# Patient Record
Sex: FEMALE | Race: WHITE | NOT HISPANIC OR LATINO | URBAN - METROPOLITAN AREA
[De-identification: names, ages, dates, MRNs, and addresses within clinical notes are randomized per-mention and may not be internally consistent; named-entity substitution may affect disease eponyms.]

---

## 2017-04-19 ENCOUNTER — OFFICE VISIT (OUTPATIENT)
Dept: FAMILY MEDICINE | Facility: CLINIC | Age: 27
End: 2017-04-19
Payer: COMMERCIAL

## 2017-04-19 VITALS
HEART RATE: 92 BPM | WEIGHT: 145.3 LBS | SYSTOLIC BLOOD PRESSURE: 118 MMHG | HEIGHT: 66 IN | BODY MASS INDEX: 23.35 KG/M2 | TEMPERATURE: 97.8 F | DIASTOLIC BLOOD PRESSURE: 86 MMHG

## 2017-04-19 DIAGNOSIS — M65.4 DE QUERVAIN'S TENOSYNOVITIS, LEFT: Primary | ICD-10-CM

## 2017-04-19 PROCEDURE — 99213 OFFICE O/P EST LOW 20 MIN: CPT | Performed by: FAMILY MEDICINE

## 2017-04-19 RX ORDER — NORETHINDRONE 0.35 MG/1
TABLET ORAL
Refills: 4 | COMMUNITY
Start: 2017-02-14 | End: 2018-01-25

## 2017-04-19 NOTE — PROGRESS NOTES
SUBJECTIVE:                                                    Yeni Zelaya is a 26 year old female who presents to clinic today for the following health issues:      Musculoskeletal problem/pain      Duration: ongoing for a few months    Description  Location: L wrist    Intensity:  moderate    Accompanying signs and symptoms: none    History  Previous similar problem: no   Previous evaluation:  none    Precipitating or alleviating factors:  Trauma or overuse: YES- worse with any motion  Aggravating factors include: lifting child    Therapies tried and outcome: nothing     Left wrist pain -   Started shortly after birth of daughter 6 months ago -   Pain a little more   Waking at night with symptoms sometimes  Initially had some in the right wrist -     -------------------------------------    Problem list and histories reviewed & adjusted, as indicated.  Additional history: as documented    Patient Active Problem List   Diagnosis     iamLUMBAGO     CARDIOVASCULAR SCREENING; LDL GOAL LESS THAN 160     Health Care Home     Dysthymia     Past Surgical History:   Procedure Laterality Date     HC TOOTH EXTRACTION W/FORCEP  2007    wisdom teeth extraction     PE TUBES  1992    b/l       Social History   Substance Use Topics     Smoking status: Former Smoker     Smokeless tobacco: Never Used      Comment: used to smoke ~ once a month     Alcohol use No      Comment: occ     Family History   Problem Relation Age of Onset     Asthma Brother      Breast Cancer Maternal Grandmother      Hypertension Maternal Grandmother      Alcohol/Drug Maternal Grandmother      Arthritis Maternal Grandmother      Depression Mother      Neurologic Disorder Mother      Thyroid Disease Father      Allergies       self     Allergies Mother      Allergies Brother      Depression       self     Depression Father      Depression Brother      GASTROINTESTINAL DISEASE Father      Asthma       self           Reviewed and updated as needed this  "visit by clinical staff  Tobacco  Allergies  Meds  Problems  Med Hx  Surg Hx  Fam Hx  Soc Hx        Reviewed and updated as needed this visit by Provider  Allergies  Meds  Problems         ROS:  Constitutional, HEENT, cardiovascular, pulmonary, gi and gu systems are negative, except as otherwise noted.    OBJECTIVE:                                                    /86  Pulse 92  Temp 97.8  F (36.6  C) (Oral)  Ht 5' 6\" (1.676 m)  Wt 145 lb 4.8 oz (65.9 kg)  LMP 04/17/2017  BMI 23.45 kg/m2  Body mass index is 23.45 kg/(m^2).  GENERAL: healthy, alert and no distress  MS: LUE exam shows normal strength and muscle mass, no deformities, no erythema, induration, or nodules, no evidence of joint effusion, ROM of all joints is normal, no evidence of joint instability and tenderness over the thumb extensors  SKIN: no suspicious lesions or rashes    Diagnostic Test Results:  none      ASSESSMENT/PLAN:                                                      1. De Quervain's tenosynovitis, left  Suspect de quervains that is exacerbated by holding her 6 month old baby.   Reviewed antiinflammatory - ibuprofen 200mg - 2 tablets q8 hours and ice.  Thumb spica splint give at todays visit   If not improving discussed injection   Pt will call or RTC if symptoms worsen or do not improve.         Zakia Murphy, DO  [facility]    "

## 2017-04-19 NOTE — MR AVS SNAPSHOT
"              After Visit Summary   4/19/2017    Yeni Zelaya    MRN: [id number]           Patient Information     Date Of Birth          1990        Visit Information        Provider Department      4/19/2017 11:00 AM Zakia Murphy, DO [facility]        Today's Diagnoses     De Quervain's tenosynovitis, left    -  1       Follow-ups after your visit        Who to contact     If you have questions or need follow up information about today's clinic visit or your schedule please contact [facility] directly at [phone].  Normal or non-critical lab and imaging results will be communicated to you by Aquaspyhart, letter or phone within 4 business days after the clinic has received the results. If you do not hear from us within 7 days, please contact the clinic through Aquaspyhart or phone. If you have a critical or abnormal lab result, we will notify you by phone as soon as possible.  Submit refill requests through [employer] or call your pharmacy and they will forward the refill request to us. Please allow 3 business days for your refill to be completed.          Additional Information About Your Visit        MyChart Information     [employer] gives you secure access to your electronic health record. If you see a primary care provider, you can also send messages to your care team and make appointments. If you have questions, please call your primary care clinic.  If you do not have a primary care provider, please call [phone] and they will assist you.        Care EveryWhere ID     This is your Care EveryWhere ID. This could be used by other organizations to access your [place] medical records  [id number]        Your Vitals Were     Pulse Temperature Height Last Period BMI (Body Mass Index)       92 97.8  F (36.6  C) (Oral) 5' 6\" (1.676 m) 04/17/2017 23.45 kg/m2        Blood Pressure from Last 3 Encounters:   04/19/17 118/86   08/02/16 140/72   05/18/16 138/90    Weight from Last 3 " Encounters:   04/19/17 145 lb 4.8 oz (65.9 kg)   08/02/16 159 lb 9.6 oz (72.4 kg)   05/18/16 144 lb 8 oz (65.5 kg)              Today, you had the following     No orders found for display       Primary Care Provider Office Phone # Fax #    Zakia Murphy [phone]       [facility] [address] [facility] [address]        Thank you!     Thank you for choosing [facility]  for your care. Our goal is always to provide you with excellent care. Hearing back from our patients is one way we can continue to improve our services. Please take a few minutes to complete the written survey that you may receive in the mail after your visit with us. Thank you!             Your Updated Medication List - Protect others around you: Learn how to safely use, store and throw away your medicines at www.disposemymeds.org.          This list is accurate as of: 4/19/17  5:00 PM.  Always use your most recent med list.                   Brand Name Dispense Instructions for use    YRIS 0.35 MG per tablet   Generic drug:  norethindrone          prenatal multivitamin  plus iron 27-0.8 MG Tabs per tablet     100 tablet    Take 1 tablet by mouth daily       VITAMIN D PO      Take 3,000 Units by mouth daily

## 2017-04-19 NOTE — NURSING NOTE
"Chief Complaint   Patient presents with     Musculoskeletal Problem     L wrist pain, ongoing for a few months, no specific injury     /86  Pulse 92  Temp 97.8  F (36.6  C) (Oral)  Ht 5' 6\" (1.676 m)  Wt 145 lb 4.8 oz (65.9 kg)  LMP 04/17/2017  BMI 23.45 kg/m2 Estimated body mass index is 23.45 kg/(m^2) as calculated from the following:    Height as of this encounter: 5' 6\" (1.676 m).    Weight as of this encounter: 145 lb 4.8 oz (65.9 kg).  BP completed using cuff size: regular       Health Maintenance due pending provider review:  NONE    n/a    Bia Mccormack CMA  "

## 2017-04-20 ASSESSMENT — PATIENT HEALTH QUESTIONNAIRE - PHQ9: SUM OF ALL RESPONSES TO PHQ QUESTIONS 1-9: 0

## 2017-05-01 DIAGNOSIS — Z78.9 BREASTFEEDING (INFANT): Primary | ICD-10-CM

## 2017-05-01 NOTE — TELEPHONE ENCOUNTER
Reason for Call: Request for an order or referral:    Order or referral being requested: Requesting Breast pump order to be sent to [facility]  At [phone]    Date needed: at your convenience    Has the patient been seen by the PCP for this problem? YES    Additional comments: pt would like a call as soon as RX is faxed over    Phone number Patient can be reached at:  Home number on file [phone] (home)    Best Time:  anytime    Can we leave a detailed message on this number?  YES    Call taken on 5/1/2017 at 10:59 AM by Indiana Ramirez

## 2017-05-04 NOTE — TELEPHONE ENCOUNTER
Per patient they never received the Fax for the Breast pump and she asked   That it be refaxed ASAP    Fax # [phone]    Please call patient once this has been refaxed  Thank You

## 2018-01-20 ENCOUNTER — HEALTH MAINTENANCE LETTER (OUTPATIENT)
Age: 28
End: 2018-01-20

## 2018-01-25 DIAGNOSIS — Z30.41 ENCOUNTER FOR SURVEILLANCE OF CONTRACEPTIVE PILLS: Primary | ICD-10-CM

## 2018-01-25 NOTE — TELEPHONE ENCOUNTER
[employer] message sent to patient  Due for physical  Med is patient reported/historical  Payton BURGOS RN

## 2018-01-29 NOTE — TELEPHONE ENCOUNTER
PN,  See below messages    Routing refill request to provider for review/approval because:  Medication is reported/historical  Payton BURGOS RN    Next 5 appointments (look out 90 days)     Feb 07, 2018 11:15 AM GIOVANI Cartagena Physical Adult with Zakia Murphy DO   [facility] ([facility])    [address]  [facility] [id number] [phone]

## 2018-01-30 RX ORDER — NORETHINDRONE 0.35 MG/1
1 TABLET ORAL DAILY
Qty: 28 TABLET | Refills: 0 | Status: SHIPPED | OUTPATIENT
Start: 2018-01-30 | End: 2018-02-07

## 2018-02-07 ENCOUNTER — OFFICE VISIT (OUTPATIENT)
Dept: FAMILY MEDICINE | Facility: CLINIC | Age: 28
End: 2018-02-07
Payer: COMMERCIAL

## 2018-02-07 VITALS
HEIGHT: 66 IN | DIASTOLIC BLOOD PRESSURE: 84 MMHG | HEART RATE: 97 BPM | BODY MASS INDEX: 21.86 KG/M2 | WEIGHT: 136 LBS | TEMPERATURE: 98.9 F | RESPIRATION RATE: 14 BRPM | SYSTOLIC BLOOD PRESSURE: 138 MMHG | OXYGEN SATURATION: 96 %

## 2018-02-07 DIAGNOSIS — R03.0 ELEVATED BLOOD PRESSURE READING WITHOUT DIAGNOSIS OF HYPERTENSION: ICD-10-CM

## 2018-02-07 DIAGNOSIS — Z00.00 ENCOUNTER FOR ROUTINE ADULT HEALTH EXAMINATION WITHOUT ABNORMAL FINDINGS: Primary | ICD-10-CM

## 2018-02-07 DIAGNOSIS — Z30.41 ENCOUNTER FOR SURVEILLANCE OF CONTRACEPTIVE PILLS: ICD-10-CM

## 2018-02-07 PROCEDURE — 99395 PREV VISIT EST AGE 18-39: CPT | Performed by: FAMILY MEDICINE

## 2018-02-07 PROCEDURE — 36415 COLL VENOUS BLD VENIPUNCTURE: CPT | Performed by: FAMILY MEDICINE

## 2018-02-07 PROCEDURE — 84443 ASSAY THYROID STIM HORMONE: CPT | Performed by: FAMILY MEDICINE

## 2018-02-07 PROCEDURE — 80048 BASIC METABOLIC PNL TOTAL CA: CPT | Performed by: FAMILY MEDICINE

## 2018-02-07 PROCEDURE — G0145 SCR C/V CYTO,THINLAYER,RESCR: HCPCS | Performed by: FAMILY MEDICINE

## 2018-02-07 PROCEDURE — 80061 LIPID PANEL: CPT | Performed by: FAMILY MEDICINE

## 2018-02-07 RX ORDER — NORETHINDRONE 0.35 MG/1
1 TABLET ORAL DAILY
Qty: 84 TABLET | Refills: 4 | Status: SHIPPED | OUTPATIENT
Start: 2018-02-07 | End: 2019-06-18

## 2018-02-07 ASSESSMENT — PATIENT HEALTH QUESTIONNAIRE - PHQ9
SUM OF ALL RESPONSES TO PHQ QUESTIONS 1-9: 0
SUM OF ALL RESPONSES TO PHQ QUESTIONS 1-9: 0

## 2018-02-07 NOTE — MR AVS SNAPSHOT
After Visit Summary   2/7/2018    Yeni Zelaya    MRN: [id number]           Patient Information     Date Of Birth          1990        Visit Information        Provider Department      2/7/2018 11:15 AM Zakia Murphy DO [facility]        Today's Diagnoses     Encounter for routine adult health examination without abnormal findings    -  1    Encounter for surveillance of contraceptive pills        Elevated blood pressure reading without diagnosis of hypertension          Care Instructions      Preventive Health Recommendations  Female Ages 26 - 39  Yearly exam:   See your health care provider every year in order to    Review health changes.     Discuss preventive care.      Review your medicines if you your doctor has prescribed any.    Until age 30: Get a Pap test every three years (more often if you have had an abnormal result).    After age 30: Talk to your doctor about whether you should have a Pap test every 3 years or have a Pap test with HPV screening every 5 years.   You do not need a Pap test if your uterus was removed (hysterectomy) and you have not had cancer.  You should be tested each year for STDs (sexually transmitted diseases), if you're at risk.   Talk to your provider about how often to have your cholesterol checked.  If you are at risk for diabetes, you should have a diabetes test (fasting glucose).  Shots: Get a flu shot each year. Get a tetanus shot every 10 years.   Nutrition:     Eat at least 5 servings of fruits and vegetables each day.    Eat whole-grain bread, whole-wheat pasta and brown rice instead of white grains and rice.    Talk to your provider about Calcium and Vitamin D.     Lifestyle    Exercise at least 150 minutes a week (30 minutes a day, 5 days of the week). This will help you control your weight and prevent disease.    Limit alcohol to one drink per day.    No smoking.     Wear sunscreen to prevent skin cancer.    See your dentist every six  "months for an exam and cleaning.            Follow-ups after your visit        Who to contact     If you have questions or need follow up information about today's clinic visit or your schedule please contact [facility] directly at [phone].  Normal or non-critical lab and imaging results will be communicated to you by [employer], letter or phone within 4 business days after the clinic has received the results. If you do not hear from us within 7 days, please contact the clinic through [employer] or phone. If you have a critical or abnormal lab result, we will notify you by phone as soon as possible.  Submit refill requests through [employer] or call your pharmacy and they will forward the refill request to us. Please allow 3 business days for your refill to be completed.          Additional Information About Your Visit        [employer] Information     [employer] gives you secure access to your electronic health record. If you see a primary care provider, you can also send messages to your care team and make appointments. If you have questions, please call your primary care clinic.  If you do not have a primary care provider, please call [phone] and they will assist you.        Care EveryWhere ID     This is your Care EveryWhere ID. This could be used by other organizations to access your [place] medical records  [id number]        Your Vitals Were     Pulse Temperature Respirations Height Pulse Oximetry Breastfeeding?    97 98.9  F (37.2  C) (Tympanic) 14 5' 6\" (1.676 m) 96% No    BMI (Body Mass Index)                   21.95 kg/m2            Blood Pressure from Last 3 Encounters:   02/07/18 138/84   04/19/17 118/86   08/02/16 140/72    Weight from Last 3 Encounters:   02/07/18 136 lb (61.7 kg)   04/19/17 145 lb 4.8 oz (65.9 kg)   08/02/16 159 lb 9.6 oz (72.4 kg)              We Performed the Following     Basic metabolic panel  (Ca, Cl, CO2, Creat, Gluc, K, Na, BUN)     Lipid panel reflex to direct LDL " Non-fasting     Pap imaged thin layer screen reflex to HPV if ASCUS - recommend age 25 - 29     TSH with free T4 reflex          Where to get your medicines      These medications were sent to [id number] - [place], [place] - [address]  [address] [address]     Phone:  [phone]     YRIS 0.35 MG per tablet          Primary Care Provider Office Phone # Fax #    Zakia Murphy,  [phone] [address] [address]        Equal Access to Services     [facility] SATURNINO : Hadii aad ku hadasho Soomaali, waaxda luqadaha, qaybta kaalmada adeegyada, kirit storey . So [facility] [phone].    ATENCIÓN: Si habla español, tiene a tamez disposición servicios gratuitos de asistencia lingüística. Llame al [phone].    We comply with applicable federal civil rights laws and [place] laws. We do not discriminate on the basis of race, color, national origin, age, disability, sex, sexual orientation, or gender identity.            Thank you!     Thank you for choosing [facility]  for your care. Our goal is always to provide you with excellent care. Hearing back from our patients is one way we can continue to improve our services. Please take a few minutes to complete the written survey that you may receive in the mail after your visit with us. Thank you!             Your Updated Medication List - Protect others around you: Learn how to safely use, store and throw away your medicines at www.disposemymeds.org.          This list is accurate as of 2/7/18  2:07 PM.  Always use your most recent med list.                   Brand Name Dispense Instructions for use Diagnosis    YRIS 0.35 MG per tablet   Generic drug:  norethindrone     84 tablet    Take 1 tablet (0.35 mg) by mouth daily    Encounter for surveillance of contraceptive pills       order for DME     1 Device    Breast pump - double electric     Breastfeeding (infant)       prenatal multivitamin plus iron 27-0.8 MG Tabs per tablet     100 tablet    Take 1 tablet by mouth daily        UNABLE TO FIND      Take 1,200 mg by mouth daily MEDICATION NAME: sunflower- Lecithin 1200mg capsule- 1-3 per day for clogged milk ducts        VITAMIN D PO      Take 3,000 Units by mouth daily

## 2018-02-08 LAB
ANION GAP SERPL CALCULATED.3IONS-SCNC: 5 MMOL/L (ref 3–14)
BUN SERPL-MCNC: 14 MG/DL (ref 7–30)
CALCIUM SERPL-MCNC: 9.4 MG/DL (ref 8.5–10.1)
CHLORIDE SERPL-SCNC: 104 MMOL/L (ref 94–109)
CHOLEST SERPL-MCNC: 158 MG/DL
CO2 SERPL-SCNC: 31 MMOL/L (ref 20–32)
CREAT SERPL-MCNC: 0.68 MG/DL (ref 0.52–1.04)
GFR SERPL CREATININE-BSD FRML MDRD: >90 ML/MIN/1.7M2
GLUCOSE SERPL-MCNC: 78 MG/DL (ref 70–99)
HDLC SERPL-MCNC: 77 MG/DL
LDLC SERPL CALC-MCNC: 72 MG/DL
NONHDLC SERPL-MCNC: 81 MG/DL
POTASSIUM SERPL-SCNC: 4.3 MMOL/L (ref 3.4–5.3)
SODIUM SERPL-SCNC: 140 MMOL/L (ref 133–144)
TRIGL SERPL-MCNC: 44 MG/DL
TSH SERPL DL<=0.005 MIU/L-ACNC: 1.55 MU/L (ref 0.4–4)

## 2018-02-08 ASSESSMENT — PATIENT HEALTH QUESTIONNAIRE - PHQ9: SUM OF ALL RESPONSES TO PHQ QUESTIONS 1-9: 0

## 2018-02-08 NOTE — PROGRESS NOTES
Dear Yeni,   Your test results are all back -   -All of your labs are normal.  Let us know if you have any questions.  -Zakia Murphy, DO

## 2018-02-09 LAB
COPATH REPORT: NORMAL
PAP: NORMAL

## 2018-07-20 ENCOUNTER — OFFICE VISIT (OUTPATIENT)
Dept: FAMILY MEDICINE | Facility: CLINIC | Age: 28
End: 2018-07-20
Payer: COMMERCIAL

## 2018-07-20 VITALS
BODY MASS INDEX: 22.03 KG/M2 | DIASTOLIC BLOOD PRESSURE: 84 MMHG | SYSTOLIC BLOOD PRESSURE: 134 MMHG | OXYGEN SATURATION: 98 % | WEIGHT: 137.1 LBS | TEMPERATURE: 97.8 F | HEIGHT: 66 IN

## 2018-07-20 DIAGNOSIS — M79.2 NERVE PAIN: Primary | ICD-10-CM

## 2018-07-20 PROCEDURE — 99213 OFFICE O/P EST LOW 20 MIN: CPT | Performed by: FAMILY MEDICINE

## 2018-07-20 NOTE — MR AVS SNAPSHOT
"              After Visit Summary   7/20/2018    Yeni Zelaya    MRN: [id number]           Patient Information     Date Of Birth          1990        Visit Information        Provider Department      7/20/2018 11:15 AM Zakia Murpyh, DO [facility]        Today's Diagnoses     Nerve pain    -  1       Follow-ups after your visit        Who to contact     If you have questions or need follow up information about today's clinic visit or your schedule please contact [facility] directly at [phone].  Normal or non-critical lab and imaging results will be communicated to you by [employer], letter or phone within 4 business days after the clinic has received the results. If you do not hear from us within 7 days, please contact the clinic through [employer] or phone. If you have a critical or abnormal lab result, we will notify you by phone as soon as possible.  Submit refill requests through Viewbix or call your pharmacy and they will forward the refill request to us. Please allow 3 business days for your refill to be completed.          Additional Information About Your Visit        MyChart Information     [employer] gives you secure access to your electronic health record. If you see a primary care provider, you can also send messages to your care team and make appointments. If you have questions, please call your primary care clinic.  If you do not have a primary care provider, please call [phone] and they will assist you.        Care EveryWhere ID     This is your Care EveryWhere ID. This could be used by other organizations to access your [place] medical records  [id number]        Your Vitals Were     Temperature Height Pulse Oximetry Breastfeeding? BMI (Body Mass Index)       97.8  F (36.6  C) (Oral) 5' 6\" (1.676 m) 98% Yes 22.13 kg/m2        Blood Pressure from Last 3 Encounters:   07/20/18 134/84   02/07/18 138/84   04/19/17 118/86    Weight from Last 3 Encounters:   07/20/18 " 137 lb 1.6 oz (62.2 kg)   02/07/18 136 lb (61.7 kg)   04/19/17 145 lb 4.8 oz (65.9 kg)              Today, you had the following     No orders found for display       Primary Care Provider Office Phone # Fax #    Zakia Murphy [phone] [address] [address]        Equal Access to Services     RAZIA MATAMOROS : Hadii aad ku hadasho Soomaali, waaxda luqadaha, qaybta kaalmada adeegyada, waxay idiin hayaan adeeg kharash lanaldo . So [facility] [phone].    ATENCIÓN: Si habla español, tiene a tamez disposición servicios gratuitos de asistencia lingüística. Llame al [phone].    We comply with applicable federal civil rights laws and [place] laws. We do not discriminate on the basis of race, color, national origin, age, disability, sex, sexual orientation, or gender identity.            Thank you!     Thank you for choosing [facility]  for your care. Our goal is always to provide you with excellent care. Hearing back from our patients is one way we can continue to improve our services. Please take a few minutes to complete the written survey that you may receive in the mail after your visit with us. Thank you!             Your Updated Medication List - Protect others around you: Learn how to safely use, store and throw away your medicines at www.disposemymeds.org.          This list is accurate as of 7/20/18  4:52 PM.  Always use your most recent med list.                   Brand Name Dispense Instructions for use Diagnosis    YRIS 0.35 MG per tablet   Generic drug:  norethindrone     84 tablet    Take 1 tablet (0.35 mg) by mouth daily    Encounter for surveillance of contraceptive pills       order for DME     1 Device    Breast pump - double electric    Breastfeeding (infant)       prenatal multivitamin plus iron 27-0.8 MG Tabs per tablet     100 tablet    Take 1 tablet by mouth daily        UNABLE TO FIND      Take 1,200 mg by mouth daily MEDICATION NAME:  sunflower- Lecithin 1200mg capsule- 1-3 per day for clogged milk ducts        VITAMIN D PO      Take 3,000 Units by mouth daily

## 2018-07-20 NOTE — PROGRESS NOTES
SUBJECTIVE:   Yeni Zelaya is a 28 year old female who presents to clinic today for the following health issues:      Burning sensation in are on L thigh      Duration: 6-7 weeks    Description (location/character/radiation):  Burning sensation feeling on back of L thigh, happening intermittently, pt can't seem to find a pattern to when it happens. Lasting from 1-20 minutes approximately    Intensity:  moderate    Accompanying signs and symptoms: no recent travel, no new irritants introduced.  Pt states feels more skin like, not muscle    History (similar episodes/previous evaluation): None    Precipitating or alleviating factors:     Therapies tried and outcome: None     Left posterior thigh sensation   Feels like rug burn  At onset - just noticed - and no skin change  Intermittent   Multiple times a day  No trauma or injury  No skin lesions  No new back issues   -------------------------------------    Problem list and histories reviewed & adjusted, as indicated.  Additional history: as documented    Patient Active Problem List   Diagnosis     iamLUMBAGO     CARDIOVASCULAR SCREENING; LDL GOAL LESS THAN 160     Health Care Home     Past Surgical History:   Procedure Laterality Date     HC TOOTH EXTRACTION W/FORCEP  2007    wisdom teeth extraction     PE TUBES  1992    b/l       Social History   Substance Use Topics     Smoking status: Former Smoker     Smokeless tobacco: Never Used      Comment: used to smoke ~ once a month     Alcohol use No      Comment: occ     Family History   Problem Relation Age of Onset     Asthma Brother      Breast Cancer Maternal Grandmother      Hypertension Maternal Grandmother      Alcohol/Drug Maternal Grandmother      Arthritis Maternal Grandmother      Depression Mother      Neurologic Disorder Mother      Thyroid Disease Father      Allergies       self     Allergies Mother      Allergies Brother      Depression       self     Depression Father      Depression Brother       "GASTROINTESTINAL DISEASE Father      Asthma       self           Reviewed and updated as needed this visit by clinical staff       Reviewed and updated as needed this visit by Provider         ROS:  Constitutional, HEENT, cardiovascular, pulmonary, gi and gu systems are negative, except as otherwise noted.    OBJECTIVE:     /84  Temp 97.8  F (36.6  C) (Oral)  Ht 5' 6\" (1.676 m)  Wt 137 lb 1.6 oz (62.2 kg)  SpO2 98%  Breastfeeding? Yes  BMI 22.13 kg/m2  Body mass index is 22.13 kg/(m^2).  GENERAL: healthy, alert and no distress  MS: no gross musculoskeletal defects noted, no edema  SKIN: no suspicious lesions or rashes  NEURO: Normal strength and tone and sensory exam grossly normal    Diagnostic Test Results:  none     ASSESSMENT/PLAN:     1. Nerve pain  Left posterior cutaneous nerve impingement symptosm  Discussed this is typically superficial since in isolation  Advised warning signs or symptoms - worsens or develops any other symptoms  Consider topical lidocaine if painful  If persists would refer to neurology.      Pt will call or RTC if symptoms worsen or do not improve.      Zakia Murphy, DO  [facility]    "

## 2018-07-20 NOTE — NURSING NOTE
"Chief Complaint   Patient presents with     burning sensation     /84  Temp 97.8  F (36.6  C) (Oral)  Ht 5' 6\" (1.676 m)  Wt 137 lb 1.6 oz (62.2 kg)  SpO2 98%  Breastfeeding? Yes  BMI 22.13 kg/m2 Estimated body mass index is 22.13 kg/(m^2) as calculated from the following:    Height as of this encounter: 5' 6\" (1.676 m).    Weight as of this encounter: 137 lb 1.6 oz (62.2 kg).        Health Maintenance due pending provider review:  NONE    n/a    Bia Mccormack CMA  "

## 2019-03-28 ENCOUNTER — RECORDS - HEALTHEAST (OUTPATIENT)
Dept: ADMINISTRATIVE | Facility: OTHER | Age: 29
End: 2019-03-28

## 2019-04-02 ENCOUNTER — ANESTHESIA - HEALTHEAST (OUTPATIENT)
Dept: SURGERY | Facility: AMBULATORY SURGERY CENTER | Age: 29
End: 2019-04-02

## 2019-04-02 ASSESSMENT — MIFFLIN-ST. JEOR
SCORE: 1359.31
SCORE: 1359.31

## 2019-04-03 ENCOUNTER — SURGERY - HEALTHEAST (OUTPATIENT)
Dept: SURGERY | Facility: AMBULATORY SURGERY CENTER | Age: 29
End: 2019-04-03

## 2019-04-03 ENCOUNTER — HOSPITAL ENCOUNTER (OUTPATIENT)
Dept: SURGERY | Facility: AMBULATORY SURGERY CENTER | Age: 29
Discharge: HOME | End: 2019-04-03
Attending: OBSTETRICS & GYNECOLOGY | Admitting: OBSTETRICS & GYNECOLOGY
Payer: COMMERCIAL

## 2019-04-03 DIAGNOSIS — O02.1 MISSED ABORTION: ICD-10-CM

## 2019-04-03 ASSESSMENT — MIFFLIN-ST. JEOR
SCORE: 1358.18
SCORE: 1358.18

## 2019-06-18 ENCOUNTER — OFFICE VISIT (OUTPATIENT)
Dept: FAMILY MEDICINE | Facility: CLINIC | Age: 29
End: 2019-06-18
Payer: COMMERCIAL

## 2019-06-18 VITALS
DIASTOLIC BLOOD PRESSURE: 76 MMHG | TEMPERATURE: 98.5 F | SYSTOLIC BLOOD PRESSURE: 130 MMHG | HEIGHT: 66 IN | WEIGHT: 140.4 LBS | HEART RATE: 96 BPM | BODY MASS INDEX: 22.56 KG/M2

## 2019-06-18 DIAGNOSIS — N94.9 FEMALE GENITAL SYMPTOMS: ICD-10-CM

## 2019-06-18 DIAGNOSIS — Z00.00 ROUTINE GENERAL MEDICAL EXAMINATION AT A HEALTH CARE FACILITY: Primary | ICD-10-CM

## 2019-06-18 LAB
ALBUMIN UR-MCNC: NEGATIVE MG/DL
APPEARANCE UR: CLEAR
BILIRUB UR QL STRIP: NEGATIVE
COLOR UR AUTO: YELLOW
GLUCOSE UR STRIP-MCNC: NEGATIVE MG/DL
HGB UR QL STRIP: ABNORMAL
KETONES UR STRIP-MCNC: NEGATIVE MG/DL
LEUKOCYTE ESTERASE UR QL STRIP: ABNORMAL
MUCOUS THREADS #/AREA URNS LPF: PRESENT /LPF
NITRATE UR QL: NEGATIVE
NON-SQ EPI CELLS #/AREA URNS LPF: ABNORMAL /LPF
PH UR STRIP: 6.5 PH (ref 5–7)
RBC #/AREA URNS AUTO: ABNORMAL /HPF
SOURCE: ABNORMAL
SP GR UR STRIP: 1.01 (ref 1–1.03)
SPECIMEN SOURCE: ABNORMAL
UROBILINOGEN UR STRIP-ACNC: 0.2 EU/DL (ref 0.2–1)
WBC #/AREA URNS AUTO: ABNORMAL /HPF
WET PREP SPEC: ABNORMAL

## 2019-06-18 PROCEDURE — 81001 URINALYSIS AUTO W/SCOPE: CPT | Performed by: FAMILY MEDICINE

## 2019-06-18 PROCEDURE — 99395 PREV VISIT EST AGE 18-39: CPT | Performed by: FAMILY MEDICINE

## 2019-06-18 PROCEDURE — 87210 SMEAR WET MOUNT SALINE/INK: CPT | Performed by: FAMILY MEDICINE

## 2019-06-18 ASSESSMENT — MIFFLIN-ST. JEOR: SCORE: 1383.6

## 2019-06-18 NOTE — NURSING NOTE
"Chief Complaint   Patient presents with     Physical     /76   Pulse 96   Temp 98.5  F (36.9  C) (Oral)   Ht 1.676 m (5' 6\")   Wt 63.7 kg (140 lb 6.4 oz)   LMP 05/25/2019   BMI 22.66 kg/m   Estimated body mass index is 22.66 kg/m  as calculated from the following:    Height as of this encounter: 1.676 m (5' 6\").    Weight as of this encounter: 63.7 kg (140 lb 6.4 oz).        Health Maintenance due pending provider review:  NONE    n/a    Bia Mccormack CMA  "

## 2019-06-18 NOTE — RESULT ENCOUNTER NOTE
Dear Yeni,   Your test results are all back -   Your urine test is negative - no infection.  Your vaginal swab shows a little yeast.  I suspect this could be the cause of some of your symptoms.  Since you are trying to get pregnant, we should avoid the diflucan tablet.  I recommend using over the counter miconazole vaginal cream nightly.    Let us know if you have any questions.  -Zakia Murphy, DO

## 2019-06-24 ENCOUNTER — E-VISIT (OUTPATIENT)
Dept: FAMILY MEDICINE | Facility: CLINIC | Age: 29
End: 2019-06-24
Payer: COMMERCIAL

## 2019-06-24 DIAGNOSIS — Z53.9 ERRONEOUS ENCOUNTER--DISREGARD: Primary | ICD-10-CM

## 2019-06-24 PROCEDURE — 99207 ZZC NO BILLABLE SERVICE THIS VISIT: CPT | Performed by: FAMILY MEDICINE

## 2019-07-09 ENCOUNTER — RECORDS - HEALTHEAST (OUTPATIENT)
Dept: LAB | Facility: CLINIC | Age: 29
End: 2019-07-09

## 2019-07-09 LAB
BASOPHILS # BLD AUTO: 0 THOU/UL (ref 0–0.2)
BASOPHILS NFR BLD AUTO: 0 % (ref 0–2)
EOSINOPHIL # BLD AUTO: 0.1 THOU/UL (ref 0–0.4)
EOSINOPHIL NFR BLD AUTO: 1 % (ref 0–6)
ERYTHROCYTE [DISTWIDTH] IN BLOOD BY AUTOMATED COUNT: 12 % (ref 11–14.5)
HCG SERPL-ACNC: ABNORMAL MLU/ML (ref 0–4)
HCT VFR BLD AUTO: 40.2 % (ref 35–47)
HGB BLD-MCNC: 13.8 G/DL (ref 12–16)
HIV 1+2 AB+HIV1 P24 AG SERPL QL IA: NEGATIVE
LYMPHOCYTES # BLD AUTO: 2.1 THOU/UL (ref 0.8–4.4)
LYMPHOCYTES NFR BLD AUTO: 28 % (ref 20–40)
MCH RBC QN AUTO: 29.6 PG (ref 27–34)
MCHC RBC AUTO-ENTMCNC: 34.3 G/DL (ref 32–36)
MCV RBC AUTO: 86 FL (ref 80–100)
MONOCYTES # BLD AUTO: 0.6 THOU/UL (ref 0–0.9)
MONOCYTES NFR BLD AUTO: 8 % (ref 2–10)
NEUTROPHILS # BLD AUTO: 4.7 THOU/UL (ref 2–7.7)
NEUTROPHILS NFR BLD AUTO: 63 % (ref 50–70)
PLATELET # BLD AUTO: 341 THOU/UL (ref 140–440)
PMV BLD AUTO: 10.4 FL (ref 8.5–12.5)
PROGEST SERPL-MCNC: 23.7 NG/ML
RBC # BLD AUTO: 4.66 MILL/UL (ref 3.8–5.4)
WBC: 7.5 THOU/UL (ref 4–11)

## 2019-07-10 LAB
ABO/RH(D): NORMAL
ABORH REPEAT: NORMAL
ANTIBODY SCREEN: NEGATIVE
HBV SURFACE AG SERPL QL IA: NEGATIVE
RUBV IGG SERPL QL IA: POSITIVE
T PALLIDUM AB SER QL: NEGATIVE

## 2019-10-21 NOTE — PROGRESS NOTES
SUBJECTIVE:   CC: Yeni Zelaya is an 27 year old woman who presents for preventive health visit.     Physical   Annual:     Getting at least 3 servings of Calcium per day::  Yes    Bi-annual eye exam::  NO    Dental care twice a year::  Yes    Sleep apnea or symptoms of sleep apnea::  None    Diet::  Regular (no restrictions)    Frequency of exercise::  None    Taking medications regularly::  Yes    Medication side effects::  None    Additional concerns today::  No                -------------------------------------    Today's PHQ-2 Score:   PHQ-2 ( 1999 [employer]) 2/7/2018   Q1: Little interest or pleasure in doing things 0   Q2: Feeling down, depressed or hopeless 0   PHQ-2 Score 0   Q1: Little interest or pleasure in doing things Not at all   Q2: Feeling down, depressed or hopeless Not at all   PHQ-2 Score 0       Abuse: Current or Past(Physical, Sexual or Emotional)- No  Do you feel safe in your environment - Yes    Social History   Substance Use Topics     Smoking status: Former Smoker     Smokeless tobacco: Never Used      Comment: used to smoke ~ once a month     Alcohol use No      Comment: occ     Alcohol Use 2/7/2018   If you drink alcohol, do you typically have greater than 3 drinks per day OR greater than 7 drinks per week?   No   No flowsheet data found.    Reviewed orders with patient.  Reviewed health maintenance and updated orders accordingly - Yes  Patient Active Problem List   Diagnosis     [email or web address]     CARDIOVASCULAR SCREENING; LDL GOAL LESS THAN 160     Health Care Home     Past Surgical History:   Procedure Laterality Date     HC TOOTH EXTRACTION W/FORCEP  2007    wisdom teeth extraction     PE TUBES  1992    b/l       Social History   Substance Use Topics     Smoking status: Former Smoker     Smokeless tobacco: Never Used      Comment: used to smoke ~ once a month     Alcohol use No      Comment: occ     Family History   Problem Relation Age of Onset     Asthma Brother      Breast  Pt notified via my chart     "Cancer Maternal Grandmother      Hypertension Maternal Grandmother      Alcohol/Drug Maternal Grandmother      Arthritis Maternal Grandmother      Depression Mother      Neurologic Disorder Mother      Thyroid Disease Father      Allergies       self     Allergies Mother      Allergies Brother      Depression       self     Depression Father      Depression Brother      GASTROINTESTINAL DISEASE Father      Asthma       self           Mammogram not appropriate for this patient based on age.    Pertinent mammograms are reviewed under the imaging tab.  History of abnormal Pap smear: NO - age 21-29 PAP every 3 years recommended    Reviewed and updated as needed this visit by clinical staff  Tobacco  Allergies  Meds  Problems  Med Hx  Surg Hx  Fam Hx  Soc Hx          Reviewed and updated as needed this visit by Provider  Allergies  Meds  Problems            Review of Systems  C: NEGATIVE for fever, chills, change in weight  I: NEGATIVE for worrisome rashes, moles or lesions  E: NEGATIVE for vision changes or irritation  ENT: NEGATIVE for ear, mouth and throat problems  R: NEGATIVE for significant cough or SOB  B: NEGATIVE for masses, tenderness or discharge  CV: NEGATIVE for chest pain, palpitations or peripheral edema  GI: NEGATIVE for nausea, abdominal pain, heartburn, or change in bowel habits   female: amenorrhea - breastfeeding  M: NEGATIVE for significant arthralgias or myalgia  N: NEGATIVE for weakness, dizziness or paresthesias  P: NEGATIVE for changes in mood or affect     OBJECTIVE:   /84  Pulse 97  Temp 98.9  F (37.2  C) (Tympanic)  Resp 14  Ht 5' 6\" (1.676 m)  Wt 136 lb (61.7 kg)  SpO2 96%  Breastfeeding? No  BMI 21.95 kg/m2  Physical Exam  GENERAL: healthy, alert and no distress  EYES: Eyes grossly normal to inspection, PERRL and conjunctivae and sclerae normal  HENT: ear canals and TM's normal, nose and mouth without ulcers or lesions  NECK: no adenopathy, no asymmetry, masses, or " "scars and thyroid normal to palpation  RESP: lungs clear to auscultation - no rales, rhonchi or wheezes  BREAST: normal without masses, tenderness or nipple discharge and no palpable axillary masses or adenopathy  CV: regular rate and rhythm, normal S1 S2, no S3 or S4, no murmur, click or rub, no peripheral edema and peripheral pulses strong  ABDOMEN: soft, nontender, no hepatosplenomegaly, no masses and bowel sounds normal   (female): normal female external genitalia, normal urethral meatus, vaginal mucosa pink, moist, well rugated, and normal cervix/adnexa/uterus without masses or discharge  MS: no gross musculoskeletal defects noted, no edema  SKIN: no suspicious lesions or rashes  NEURO: Normal strength and tone, mentation intact and speech normal  PSYCH: mentation appears normal, affect normal/bright    ASSESSMENT/PLAN:   1. Encounter for routine adult health examination without abnormal findings     - Pap imaged thin layer screen reflex to HPV if ASCUS - recommend age 25 - 29  - Lipid panel reflex to direct LDL Non-fasting    2. Encounter for surveillance of contraceptive pills     - YRIS 0.35 MG per tablet; Take 1 tablet (0.35 mg) by mouth daily  Dispense: 84 tablet; Refill: 4    3. Elevated blood pressure reading without diagnosis of hypertension     - TSH with free T4 reflex  - Basic metabolic panel  (Ca, Cl, CO2, Creat, Gluc, K, Na, BUN)    COUNSELING:  Reviewed preventive health counseling, as reflected in patient instructions    BP Screening:   Last 3 BP Readings:    BP Readings from Last 3 Encounters:   02/07/18 138/84   04/19/17 118/86   08/02/16 140/72       The following was recommended to the patient:  Re-screen BP within a year and recommended lifestyle modifications     reports that she has quit smoking. She has never used smokeless tobacco.    Estimated body mass index is 21.95 kg/(m^2) as calculated from the following:    Height as of this encounter: 5' 6\" (1.676 m).    Weight as of this " encounter: 136 lb (61.7 kg).       Counseling Resources:  ATP IV Guidelines  Pooled Cohorts Equation Calculator  Breast Cancer Risk Calculator  FRAX Risk Assessment  ICSI Preventive Guidelines  Dietary Guidelines for Americans, 2010  USDA's MyPlate  ASA Prophylaxis  Lung CA Screening    Zakia Murphy DO  [facility]  Answers for HPI/ROS submitted by the patient on 2/7/2018   PHQ-2 Score: 0  PHQ9 TOTAL SCORE: 0

## 2019-12-20 ENCOUNTER — RECORDS - HEALTHEAST (OUTPATIENT)
Dept: LAB | Facility: CLINIC | Age: 29
End: 2019-12-20

## 2019-12-21 LAB — T PALLIDUM AB SER QL: NEGATIVE

## 2020-02-07 ENCOUNTER — RECORDS - HEALTHEAST (OUTPATIENT)
Dept: LAB | Facility: CLINIC | Age: 30
End: 2020-02-07

## 2020-02-07 ENCOUNTER — RECORDS - HEALTHEAST (OUTPATIENT)
Dept: ADMINISTRATIVE | Facility: OTHER | Age: 30
End: 2020-02-07

## 2020-02-09 LAB
ALLERGIC TO PENICILLIN: NO
GP B STREP DNA SPEC QL NAA+PROBE: POSITIVE

## 2020-02-14 ENCOUNTER — RECORDS - HEALTHEAST (OUTPATIENT)
Dept: LAB | Facility: CLINIC | Age: 30
End: 2020-02-14

## 2020-02-14 ENCOUNTER — HOSPITAL ENCOUNTER (OUTPATIENT)
Dept: OBGYN | Facility: HOSPITAL | Age: 30
Discharge: HOME | End: 2020-02-14
Attending: OBSTETRICS & GYNECOLOGY | Admitting: OBSTETRICS & GYNECOLOGY

## 2020-02-14 LAB
ALBUMIN SERPL-MCNC: 3 G/DL (ref 3.5–5)
ALP SERPL-CCNC: 177 U/L (ref 45–120)
ALT SERPL W P-5'-P-CCNC: 13 U/L (ref 0–45)
AST SERPL W P-5'-P-CCNC: 16 U/L (ref 0–40)
BILIRUB DIRECT SERPL-MCNC: <0.1 MG/DL
BILIRUB SERPL-MCNC: 0.2 MG/DL (ref 0–1)
CREAT SERPL-MCNC: 0.62 MG/DL (ref 0.6–1.1)
GFR SERPL CREATININE-BSD FRML MDRD: >60 ML/MIN/1.73M2
PROT SERPL-MCNC: 6.4 G/DL (ref 6–8)
URATE SERPL-MCNC: 3.8 MG/DL (ref 2–7.5)

## 2020-02-21 ENCOUNTER — RECORDS - HEALTHEAST (OUTPATIENT)
Dept: ADMINISTRATIVE | Facility: OTHER | Age: 30
End: 2020-02-21

## 2020-03-01 ENCOUNTER — HOME CARE/HOSPICE - HEALTHEAST (OUTPATIENT)
Dept: HOME HEALTH SERVICES | Facility: HOME HEALTH | Age: 30
End: 2020-03-01

## 2021-05-27 NOTE — ANESTHESIA CARE TRANSFER NOTE
Last vitals:   Vitals:    04/03/19 0724   BP: 111/55   Pulse: 88   Resp: 16   Temp: 36.9  C (98.4  F)   SpO2: 100%     Patient's level of consciousness is drowsy  Spontaneous respirations: yes  Maintains airway independently: yes  Dentition unchanged: yes  Oropharynx: oropharynx clear of all foreign objects    QCDR Measures:  ASA# 20 - Surgical Safety Checklist: WHO surgical safety checklist completed prior to induction    PQRS# 430 - Adult PONV Prevention: 4558F - Pt received => 2 anti-emetic agents (different classes) preop & intraop  ASA# 8 - Peds PONV Prevention: NA - Not pediatric patient, not GA or 2 or more risk factors NOT present  PQRS# 424 - Rosa-op Temp Management: 4559F - At least one body temp DOCUMENTED => 35.5C or 95.9F within required timeframe  PQRS# 426 - PACU Transfer Protocol: - Transfer of care checklist used  ASA# 14 - Acute Post-op Pain: ASA14B - Patient did NOT experience pain >= 7 out of 10

## 2021-05-27 NOTE — H&P
I have performed an assessment and examined the patient, as necessary, to update the patient's current status that may have changed since the prior History and Physical.  The History & Physical has been reviewed and changes to the patient's status are as follows CV-RRR. Lungs-CTAB.

## 2021-05-27 NOTE — ANESTHESIA PREPROCEDURE EVALUATION
Anesthesia Evaluation      Patient summary reviewed   No history of anesthetic complications     Airway   Mallampati: II  Neck ROM: full   Pulmonary - normal exam   (+) asthma (childhood)  mild,  well controlled,                          Cardiovascular - negative ROS and normal exam   Neuro/Psych    (+) depression (doing well off meds x 4 years ),     Endo/Other - negative ROS      GI/Hepatic/Renal - negative ROS           Dental - normal exam                        Anesthesia Plan  Planned anesthetic: MAC  Versed/fentanyl/propofol  Ketamine PRN  Decadron/zofran  toradol if OK with surgeon    ASA 1   Induction: intravenous   Anesthetic plan and risks discussed with: patient and spouse  Anesthesia plan special considerations: antiemetics,   Post-op plan: routine recovery

## 2021-05-27 NOTE — ANESTHESIA POSTPROCEDURE EVALUATION
Patient: Yeni Zelaya  SUCTION DILATION AND CURETTAGE  Anesthesia type: MAC    Patient location: Phase II Recovery  Last vitals:   Vitals:    04/03/19 0745   BP: (P) 111/57   Pulse: (!) (P) 115   Resp:    Temp:    SpO2: (P) 99%     Post vital signs: stable  Level of consciousness: awake and responds to simple questions  Post-anesthesia pain: pain controlled  Post-anesthesia nausea and vomiting: no  Pulmonary: unassisted, return to baseline  Cardiovascular: stable and blood pressure at baseline  Hydration: adequate  Anesthetic events: no    QCDR Measures:  ASA# 11 - Rosa-op Cardiac Arrest: ASA11B - Patient did NOT experience unanticipated cardiac arrest  ASA# 12 - Rosa-op Mortality Rate: ASA12B - Patient did NOT die  ASA# 13 - PACU Re-Intubation Rate: ASA13B - Patient did NOT require a new airway mgmt  ASA# 10 - Composite Anes Safety: ASA10A - No serious adverse event    Additional Notes:

## 2021-05-27 NOTE — OP NOTE
PROCEDURE NOTE - SUCTION D & C    NAME: Yeni Zelaya  : 1990  MRN: [id number]     DATE OF SERVICE: 4/3/2019     PREOPERATIVE DIAGNOSIS: Missed AB at 9 weeks gestation    POSTOPERATIVE DIAGNOSIS: Same    PROCEDURE: Suction dilatation and curettage    SURGEON: Eric Beebe    ANESTHESIA: MAC    ESTIMATED BLOOD LOSS: 10 mL from 4/3/2019  6:53 AM to 4/3/2019  7:16 AM    SPECIMENS: Uterine contents, sent to pathology    COMPLICATIONS: None.     HISTORY OF PRESENT ILLNESS:  This is a 28 y.o. female with a known missed . The risks, benefits and alternatives to the procedure were discussed with her at length.  She expressed understanding and wished to proceed.   B+    PROCEDURE NOTE:  Patient was brought to the operating room and after induction of anesthesia was prepped and draped in the dorsal lithotomy position.  A time out was called and the patient and the procedure were verified.  A bimanual exam was done.  Uterus was noted to be 8 weeks gestation. A paracervical block with 20cc of 1% plain lidocaine was given at 3 and 9 o'clock.  A sterile speculum was placed.  The anterior lip of the cervix was grasped with a single tooth tenaculum.  Mariaa cervical dilators were used to dilate the cervix.  A # 8 suction curette was induced and rotated to clear the uterus of all products of conception.  A sharp curette was then introduced. Once it was felt that all tissue was removed from all quadrants a decision was made to terminate the procedure.Oozing from the left tenaculum site requires one figure of eight stitch of 3-0 vicryl for hemostasis.  Sponge and instrument counts were correct. Patient tolerated the procedure well and was taken to the recovery room in good condition.      Eric Beebe      CC: Roxana Mckinney MD, Eric Beebe

## 2021-06-02 VITALS
HEIGHT: 66 IN | BODY MASS INDEX: 22.02 KG/M2 | WEIGHT: 137 LBS | WEIGHT: 137 LBS | BODY MASS INDEX: 22.02 KG/M2 | HEIGHT: 66 IN

## 2021-06-06 NOTE — PROGRESS NOTES
Late entry 1235 Dr Mckinney  Called update on blood pressure. Pt denies any difficulties. Pt states my blood pressure is always up a little bite when I am in clinic. Dr Mckinney aware. Plan is for pt to keep scheduled appointment today with Dr Mckinney. Dr Mckinney will follow up on blood pressures today in clinic. Pt aware of plan of care.

## 2021-06-06 NOTE — OP NOTE
DATE OF SERVICE: 02/14/2020    PREPROCEDURE DIAGNOSIS:  Yeni Zelaya is a 29-year-old woman who comes in for an  external cephalic version.  Her due date is 03/03/2020.  She is at 37 weeks 3 days.   Baby is in a breech presentation.  Placenta is on the right and somewhat anterior.   The ARYAN was 13.1.    I discussed and procedure and risks in the office.  The patient was given 0.2 mg of  terbutaline subcu.  I waited 15 minutes and we proceeded with the external cephalic  version.  The head was in the upper right corner.  The back was on the left side of  the abdomen.  Dr. Carol Hobbs then tried pushing the body of the breech and the  body of the baby up along the left side of the abdomen and bringing the head down  along the right in a counter clockwise fashion.  She was unsuccessful in that  attempt.  I then proceeded to try a similar maneuver and bringing the breech and the  body up along the left side of the abdomen.  When I had the breech clearly out of  the pelvis and up near the upper left side of the abdomen, I brought the head  around.  The position was confirmed by ultrasound.  Fetal heart tones were watched  for 30 minutes and were good.    IMPRESSION:  1.  Intrauterine pregnancy, 37 weeks 3 days.  2.  Breech position converted to a vertex.    SURGEONS PRESENT FOR THE CASE:  Flynn Hobbs MD and MD CAR Kelley MD  mn  D 02/14/2020 17:41:49  T 02/14/2020 20:18:28  R 02/14/2020 20:18:28  [id number]        cc:CAR MARTI MD

## 2021-06-06 NOTE — PROGRESS NOTES
Pt presents ambulatory  Accompanied by  for external  cephalic version.  Pt states she really is no feeling contractions. Pt placed on monitor.

## 2021-07-04 ENCOUNTER — HEALTH MAINTENANCE LETTER (OUTPATIENT)
Age: 31
End: 2021-07-04

## 2021-07-14 PROBLEM — Z34.90 PREGNANCY: Status: RESOLVED | Noted: 2020-02-27 | Resolved: 2020-02-29

## 2021-09-17 ENCOUNTER — LAB REQUISITION (OUTPATIENT)
Dept: LAB | Facility: CLINIC | Age: 31
End: 2021-09-17
Payer: COMMERCIAL

## 2021-09-17 DIAGNOSIS — Z01.419 ENCOUNTER FOR GYNECOLOGICAL EXAMINATION (GENERAL) (ROUTINE) WITHOUT ABNORMAL FINDINGS: ICD-10-CM

## 2021-09-17 PROCEDURE — 87624 HPV HI-RISK TYP POOLED RSLT: CPT | Mod: ORL | Performed by: FAMILY MEDICINE

## 2021-09-17 PROCEDURE — G0123 SCREEN CERV/VAG THIN LAYER: HCPCS | Mod: ORL | Performed by: FAMILY MEDICINE

## 2021-09-22 LAB
HUMAN PAPILLOMA VIRUS 16 DNA: NEGATIVE
HUMAN PAPILLOMA VIRUS 18 DNA: NEGATIVE
HUMAN PAPILLOMA VIRUS FINAL DIAGNOSIS: NORMAL
HUMAN PAPILLOMA VIRUS OTHER HR: NEGATIVE

## 2021-09-24 LAB
BKR LAB AP GYN ADEQUACY: NORMAL
BKR LAB AP GYN INTERPRETATION: NORMAL
BKR LAB AP HPV REFLEX: NORMAL
BKR LAB AP LMP: NORMAL
BKR LAB AP PREVIOUS ABNORMAL: NORMAL
PATH REPORT.COMMENTS IMP SPEC: NORMAL
PATH REPORT.RELEVANT HX SPEC: NORMAL

## 2021-10-24 ENCOUNTER — HEALTH MAINTENANCE LETTER (OUTPATIENT)
Age: 31
End: 2021-10-24

## 2022-10-15 ENCOUNTER — HEALTH MAINTENANCE LETTER (OUTPATIENT)
Age: 32
End: 2022-10-15

## 2022-10-18 ENCOUNTER — LAB REQUISITION (OUTPATIENT)
Dept: LAB | Facility: CLINIC | Age: 32
End: 2022-10-18
Payer: COMMERCIAL

## 2022-10-18 DIAGNOSIS — R03.0 ELEVATED BLOOD-PRESSURE READING, WITHOUT DIAGNOSIS OF HYPERTENSION: ICD-10-CM

## 2022-10-18 DIAGNOSIS — R00.0 TACHYCARDIA, UNSPECIFIED: ICD-10-CM

## 2022-10-18 LAB
ALBUMIN SERPL BCG-MCNC: 4.9 G/DL (ref 3.5–5.2)
ALP SERPL-CCNC: 60 U/L (ref 35–104)
ALT SERPL W P-5'-P-CCNC: 13 U/L (ref 10–35)
ANION GAP SERPL CALCULATED.3IONS-SCNC: 9 MMOL/L (ref 7–15)
AST SERPL W P-5'-P-CCNC: 13 U/L (ref 10–35)
BILIRUB SERPL-MCNC: 0.5 MG/DL
BUN SERPL-MCNC: 12.9 MG/DL (ref 6–20)
CALCIUM SERPL-MCNC: 9.9 MG/DL (ref 8.6–10)
CHLORIDE SERPL-SCNC: 102 MMOL/L (ref 98–107)
CHOLEST SERPL-MCNC: 162 MG/DL
CREAT SERPL-MCNC: 0.8 MG/DL (ref 0.51–0.95)
DEPRECATED HCO3 PLAS-SCNC: 26 MMOL/L (ref 22–29)
GFR SERPL CREATININE-BSD FRML MDRD: >90 ML/MIN/1.73M2
GLUCOSE SERPL-MCNC: 87 MG/DL (ref 70–99)
HDLC SERPL-MCNC: 64 MG/DL
LDLC SERPL CALC-MCNC: 84 MG/DL
NONHDLC SERPL-MCNC: 98 MG/DL
POTASSIUM SERPL-SCNC: 4.9 MMOL/L (ref 3.4–5.3)
PROT SERPL-MCNC: 7 G/DL (ref 6.4–8.3)
SODIUM SERPL-SCNC: 137 MMOL/L (ref 136–145)
TRIGL SERPL-MCNC: 72 MG/DL
TSH SERPL DL<=0.005 MIU/L-ACNC: 1.01 UIU/ML (ref 0.3–4.2)

## 2022-10-18 PROCEDURE — 80061 LIPID PANEL: CPT | Mod: ORL | Performed by: FAMILY MEDICINE

## 2022-10-18 PROCEDURE — 80053 COMPREHEN METABOLIC PANEL: CPT | Mod: ORL | Performed by: FAMILY MEDICINE

## 2022-10-18 PROCEDURE — 84443 ASSAY THYROID STIM HORMONE: CPT | Mod: ORL | Performed by: FAMILY MEDICINE

## 2022-12-03 ENCOUNTER — HEALTH MAINTENANCE LETTER (OUTPATIENT)
Age: 32
End: 2022-12-03

## 2023-02-28 ENCOUNTER — LAB REQUISITION (OUTPATIENT)
Dept: LAB | Facility: CLINIC | Age: 33
End: 2023-02-28
Payer: COMMERCIAL

## 2023-02-28 DIAGNOSIS — I10 ESSENTIAL (PRIMARY) HYPERTENSION: ICD-10-CM

## 2023-02-28 LAB
ANION GAP SERPL CALCULATED.3IONS-SCNC: 13 MMOL/L (ref 7–15)
BUN SERPL-MCNC: 16.2 MG/DL (ref 6–20)
CALCIUM SERPL-MCNC: 9.8 MG/DL (ref 8.6–10)
CHLORIDE SERPL-SCNC: 100 MMOL/L (ref 98–107)
CREAT SERPL-MCNC: 0.8 MG/DL (ref 0.51–0.95)
DEPRECATED HCO3 PLAS-SCNC: 23 MMOL/L (ref 22–29)
GFR SERPL CREATININE-BSD FRML MDRD: >90 ML/MIN/1.73M2
GLUCOSE SERPL-MCNC: 86 MG/DL (ref 70–99)
POTASSIUM SERPL-SCNC: 4.8 MMOL/L (ref 3.4–5.3)
SODIUM SERPL-SCNC: 136 MMOL/L (ref 136–145)

## 2023-02-28 PROCEDURE — 80048 BASIC METABOLIC PNL TOTAL CA: CPT | Mod: ORL | Performed by: FAMILY MEDICINE

## 2023-09-12 ENCOUNTER — LAB REQUISITION (OUTPATIENT)
Dept: LAB | Facility: CLINIC | Age: 33
End: 2023-09-12

## 2023-09-12 DIAGNOSIS — I10 ESSENTIAL (PRIMARY) HYPERTENSION: ICD-10-CM

## 2023-09-12 LAB
ANION GAP SERPL CALCULATED.3IONS-SCNC: 18 MMOL/L (ref 7–15)
BUN SERPL-MCNC: 18.9 MG/DL (ref 6–20)
CALCIUM SERPL-MCNC: 9.6 MG/DL (ref 8.6–10)
CHLORIDE SERPL-SCNC: 101 MMOL/L (ref 98–107)
CREAT SERPL-MCNC: 0.88 MG/DL (ref 0.51–0.95)
DEPRECATED HCO3 PLAS-SCNC: 20 MMOL/L (ref 22–29)
EGFRCR SERPLBLD CKD-EPI 2021: 88 ML/MIN/1.73M2
GLUCOSE SERPL-MCNC: 92 MG/DL (ref 70–99)
POTASSIUM SERPL-SCNC: 4 MMOL/L (ref 3.4–5.3)
SODIUM SERPL-SCNC: 139 MMOL/L (ref 136–145)

## 2023-09-12 PROCEDURE — 80048 BASIC METABOLIC PNL TOTAL CA: CPT | Performed by: FAMILY MEDICINE

## 2023-09-18 ENCOUNTER — PATIENT OUTREACH (OUTPATIENT)
Dept: CARE COORDINATION | Facility: CLINIC | Age: 33
End: 2023-09-18
Payer: COMMERCIAL

## 2023-10-02 ENCOUNTER — PATIENT OUTREACH (OUTPATIENT)
Dept: CARE COORDINATION | Facility: CLINIC | Age: 33
End: 2023-10-02
Payer: COMMERCIAL

## 2024-01-07 ENCOUNTER — HEALTH MAINTENANCE LETTER (OUTPATIENT)
Age: 34
End: 2024-01-07

## 2024-06-21 ENCOUNTER — LAB REQUISITION (OUTPATIENT)
Dept: LAB | Facility: CLINIC | Age: 34
End: 2024-06-21

## 2024-06-21 DIAGNOSIS — I10 ESSENTIAL (PRIMARY) HYPERTENSION: ICD-10-CM

## 2024-06-21 LAB
ANION GAP SERPL CALCULATED.3IONS-SCNC: 11 MMOL/L (ref 7–15)
BUN SERPL-MCNC: 19 MG/DL (ref 6–20)
CALCIUM SERPL-MCNC: 9.3 MG/DL (ref 8.6–10)
CHLORIDE SERPL-SCNC: 102 MMOL/L (ref 98–107)
CREAT SERPL-MCNC: 0.79 MG/DL (ref 0.51–0.95)
DEPRECATED HCO3 PLAS-SCNC: 24 MMOL/L (ref 22–29)
EGFRCR SERPLBLD CKD-EPI 2021: >90 ML/MIN/1.73M2
GLUCOSE SERPL-MCNC: 89 MG/DL (ref 70–99)
POTASSIUM SERPL-SCNC: 4.5 MMOL/L (ref 3.4–5.3)
SODIUM SERPL-SCNC: 137 MMOL/L (ref 135–145)

## 2024-06-21 PROCEDURE — 80048 BASIC METABOLIC PNL TOTAL CA: CPT | Performed by: FAMILY MEDICINE

## 2025-05-22 ENCOUNTER — PATIENT OUTREACH (OUTPATIENT)
Dept: CARE COORDINATION | Facility: CLINIC | Age: 35
End: 2025-05-22

## 2025-06-05 ENCOUNTER — PATIENT OUTREACH (OUTPATIENT)
Dept: CARE COORDINATION | Facility: CLINIC | Age: 35
End: 2025-06-05

## 2025-07-26 ENCOUNTER — HEALTH MAINTENANCE LETTER (OUTPATIENT)
Age: 35
End: 2025-07-26

## 2025-08-19 ENCOUNTER — LAB REQUISITION (OUTPATIENT)
Dept: LAB | Facility: CLINIC | Age: 35
End: 2025-08-19

## 2025-08-19 DIAGNOSIS — I10 ESSENTIAL (PRIMARY) HYPERTENSION: ICD-10-CM

## 2025-08-19 LAB
ANION GAP SERPL CALCULATED.3IONS-SCNC: 13 MMOL/L (ref 7–15)
BUN SERPL-MCNC: 12.7 MG/DL (ref 6–20)
CALCIUM SERPL-MCNC: 9.7 MG/DL (ref 8.8–10.4)
CHLORIDE SERPL-SCNC: 101 MMOL/L (ref 98–107)
CREAT SERPL-MCNC: 0.8 MG/DL (ref 0.51–0.95)
EGFRCR SERPLBLD CKD-EPI 2021: >90 ML/MIN/1.73M2
GLUCOSE SERPL-MCNC: 90 MG/DL (ref 70–99)
HCO3 SERPL-SCNC: 25 MMOL/L (ref 22–29)
POTASSIUM SERPL-SCNC: 4.2 MMOL/L (ref 3.4–5.3)
SODIUM SERPL-SCNC: 139 MMOL/L (ref 135–145)

## 2025-08-19 PROCEDURE — 80048 BASIC METABOLIC PNL TOTAL CA: CPT | Performed by: FAMILY MEDICINE
